# Patient Record
Sex: FEMALE | Race: WHITE
[De-identification: names, ages, dates, MRNs, and addresses within clinical notes are randomized per-mention and may not be internally consistent; named-entity substitution may affect disease eponyms.]

---

## 2017-10-10 ENCOUNTER — HOSPITAL ENCOUNTER (OUTPATIENT)
Dept: HOSPITAL 61 - KCIC US | Age: 62
Discharge: HOME | End: 2017-10-10
Attending: OBSTETRICS & GYNECOLOGY
Payer: COMMERCIAL

## 2017-10-10 DIAGNOSIS — R31.9: Primary | ICD-10-CM

## 2017-10-10 PROCEDURE — 76830 TRANSVAGINAL US NON-OB: CPT

## 2017-10-10 PROCEDURE — 76856 US EXAM PELVIC COMPLETE: CPT

## 2017-10-10 PROCEDURE — 76770 US EXAM ABDO BACK WALL COMP: CPT

## 2017-10-10 NOTE — [facility]
Pelvic ultrasound dated 10/10/2017.

 

No comparison available.

 

CLINICAL INDICATION: Right-sided pelvic pain.

 

FINDINGS:

 

Transabdominal pelvic ultrasound was performed. Uterus is retroverted and 

measures 5.6 x 3.0 x 4.5 cm. There are multiple heterogeneous and 

partially echogenic mass lesions throughout the uterine myometrium, 

largest of which measures 3.3 cm in size. The endometrium is obscured and 

not well evaluated. Small nabothian cysts at the endocervix.

 

Right ovary measures 1.5 x 0.7 x 1.8 cm. Left ovary measures 2.3 x 1.2 x 

1.8 cm. No adnexal mass or free fluid. Normal color Doppler flow to both 

ovaries.

 

IMPRESSION:

1. Fibroid uterus. Calcified fibroids result in shadowing artifact, 

limiting evaluation of the endometrium.

2. Normal sonographic appearance of the ovaries.

 

Electronically signed by: Florentin White MD (10/10/2017 10:46 AM) 

[facility]

## 2017-10-10 NOTE — [facility]
Renal ultrasound dated 10/10/2017.

 

Comparison made to 6/27/2016.

 

Clinical indication: Hematuria.

 

FINDINGS:

 

Right kidney measures 10.7 cm in length. Left kidney measures 10.7 cm in 

length. No hydronephrosis. There is a complex cystic focus at the midpole 

right kidney that measures up to 8 mm maximum dimension, not significantly

changed given differences in technique. No additional renal lesions.

 

Urinary bladder is unremarkable. Bilateral ureteral jets are seen. Aorta 

is obscured by overlying bowel gas. Limited visualized portions of IVC 

unremarkable.

 

IMPRESSION:

1. No acute sonographic abnormality. No evidence of hydronephrosis.

2. Small complex cyst at the midpole right kidney, not significantly 

changed from prior study. Continued follow-up imaging may be warranted to 

ensure stability.

 

Electronically signed by: Florentin White MD (10/10/2017 10:43 AM) 

[facility]

## 2018-06-12 ENCOUNTER — HOSPITAL ENCOUNTER (OUTPATIENT)
Dept: HOSPITAL 61 - KCIC | Age: 63
Discharge: HOME | End: 2018-06-12
Attending: FAMILY MEDICINE
Payer: COMMERCIAL

## 2018-06-12 DIAGNOSIS — M19.041: Primary | ICD-10-CM

## 2018-06-12 PROCEDURE — 73130 X-RAY EXAM OF HAND: CPT

## 2020-01-13 ENCOUNTER — HOSPITAL ENCOUNTER (OUTPATIENT)
Dept: HOSPITAL 61 - KCIC | Age: 65
Discharge: HOME | End: 2020-01-13
Attending: FAMILY MEDICINE
Payer: COMMERCIAL

## 2020-01-13 DIAGNOSIS — J84.10: ICD-10-CM

## 2020-01-13 DIAGNOSIS — J98.4: Primary | ICD-10-CM

## 2020-01-13 PROCEDURE — 71046 X-RAY EXAM CHEST 2 VIEWS: CPT

## 2020-01-13 NOTE — [facility]
EXAM: Chest, 2 views.

 

HISTORY: Chest wall pain.

 

COMPARISON: None.

 

FINDINGS: 2 views of the chest are obtained. There is no infiltrate, 

pleural effusion or pneumothorax. The heart is normal in size. There is 

hyperinflation likely due to inspiratory effort. There are calcified 

granulomas.

 

IMPRESSION: No acute pulmonary finding.

 

Electronically signed by: Rosemary Pierce MD (1/13/2020 3:16 PM) [facility]

## 2020-01-14 ENCOUNTER — HOSPITAL ENCOUNTER (OUTPATIENT)
Dept: HOSPITAL 61 - NM | Age: 65
Discharge: HOME | End: 2020-01-14
Attending: INTERNAL MEDICINE
Payer: COMMERCIAL

## 2020-01-14 DIAGNOSIS — I10: Primary | ICD-10-CM

## 2020-01-14 DIAGNOSIS — E78.5: ICD-10-CM

## 2020-01-14 DIAGNOSIS — R06.09: ICD-10-CM

## 2020-01-14 DIAGNOSIS — I20.0: ICD-10-CM

## 2020-01-14 PROCEDURE — A9500 TC99M SESTAMIBI: HCPCS

## 2020-01-14 PROCEDURE — 93017 CV STRESS TEST TRACING ONLY: CPT

## 2020-01-14 PROCEDURE — 78452 HT MUSCLE IMAGE SPECT MULT: CPT

## 2020-01-14 NOTE — RAD
MR#: [id number]

Account#: [id number]

Accession#: [id number]

Date of Study: 01/14/2020

Ordering Physician: CLEOPATRA MCCALL, 

Referring Physician: HIREN KING Tech: ELGIN Wood





--------------- APPROVED REPORT --------------





Test Type:          Exercise

Stress Nurse/Tech: MILDRED Latham RN

Test Indications: chest pressure

Cardiac History: HTN, hyperlipidemia

Medications:     See Electronic Medical Record

Medical History: See Electronic Medical Record

Resting ECG:     SR

Resting Heart Rate: 69 bpm

Resting Blood Pressure: 162/85mmHg

Pretest Chest Pain: None



Nurse/Tech Notes

Lungs CTA, S1S2

Consent: The procedure was explained to the patient in lay terms. Informed consent was witnessed. Pepe
eout was entered into [employer]. History and Stress Test performed by MILDRED Latham RN



Stress Symptoms

dyspnea



POST EXERCISE

Reason for Termination: Reached target heart rate

Target HR: 130

Exercise duration: 6:08 min:sec, 2 Stage

Max Blood Pressure: 188/58mmHg

Blood Pressure response to exercise: Normal blood pressure response during stress.

Heart Rate response to exercise: normal response

Chest Pain: No. 

Arrhythmia: No. 

ST Change: No. 



INTERPRETATION

Stress EKG Conclusion: Baseline EKG showed sinus rhythm.  No ischemic changes at peak stress.  No arr
hythmias.



Imaging Protocol

IMAGE PROTOCOL: Rest Tc-99m/stress Tc-99m 1 day



Rest:            Stress:         Viability:   

Radiopharm.Tc99m KxfiznlddTk75o Sestamibi

Myyq29fLg            33mCi            

Duration    15min.           13min.           

Img Date  01/14/2020 01/14/2020      

Inj-Img [id number].           60min.           



Post-Injection Exercise:   1 minute

Rest Admin Site:IV - Left AntecubitalAdministrator:ELGIN Wood

Stress Admin Site: IV - Left AntecubitalAdministrator: FLORY Evans, ARRT (R)(N)



STRESS DATA

End Diast. Vol.51.0mlAv. Heart Rate80.0bpm

LVEDV index BSA30.0mlCardiac Output0.0L/min

End Syst. Vol.8.0mlCO Index BSA0.0L/min

LVESV index BSA5.0mlMyocardial Mass99.0g

Eject. Hxmtvmal52.0%



Stress Scores

Regional WT1.00Summed WT2.00

Regional WM0.00Summed WM0.00



Study quality was good.  Left Ventricular size was Normal at Rest and Stress.

Lung uptake was .  Left Ventricular ejection fraction is 84%.

The rest and stress images show normal perfusion, normal contraction and thickening.



LV Perf. Quant

17 Seg. SSS0.00

17 Seg. SRS1.00

17 Seg. SDS0.00

Stress Defect Extent (% LAD)0.00Rest Defect Extent (% LAD)0.00Rev. Defect Extent (% LAD)0.00

Stress Defect Extent (% LCX) 0.00Rest Defect Extent (% LCX)0.00Rev. Defect Extent (% LCX)0.00

Stress Defect Extent (% RCA)0.00Rest Defect Extent (% RCA)0.00Rev. Defect Extent (% RCA)0.00

Stress Defect Extent (% SHIVA)0.00Rest Defect Extent (% SHIVA)0.00Rev. Defect Extent (% SHIVA)0.00



Conclusion

1. Treadmill exercise cardioisotope stress test did not show any evidence of ischemia or infarct.

2. Normal left ventricular systolic function with ejection fraction calculated at 84%.

3. Low risk for cardiac events.



Signed by : Marck Magana, 

Electronically Approved : 01/14/2020 11:24:32